# Patient Record
(demographics unavailable — no encounter records)

---

## 2025-04-21 NOTE — HISTORY OF PRESENT ILLNESS
Detail Level: Detailed
Size Of Lesion In Cm (Optional): 0
Introduction Text (Please End With A Colon): The following procedure was deferred:
[FreeTextEntry1] : Ms. Gaffney presents for follow up of HTN, DM and med adjustments. Overall feeling well. Reports labile BP readings at home.   Farxiga d/c'd per Endocrine with minimal albuminuria and recurrence of UTIs

## 2025-04-21 NOTE — ASSESSMENT
[FreeTextEntry1] : 79 yo woman with 10 year hx of DM and HTN.  --HTN : variable control noted.   increase Lisnopril to 10 mg  am and 20 mg hs.   BP control acceptable with increased dose of Coreg per Cardiology.   Continue current dose of ACEI   --Albuminuria : mild.  monitor off [id number]  --Pyuria : resolved.  --Renal Sonogram : 12/21/2023   R 10.3 cm, L 10.3 Cm  left simple cyst.